# Patient Record
(demographics unavailable — no encounter records)

---

## 2024-11-12 NOTE — HISTORY OF PRESENT ILLNESS
[de-identified] : Mr. VIKKI CUETO is a 82 year old gentleman presenting for follow up evaluation with his daughter of a left greater tuberosity fracture sustained on 7/1/24, being treated nonoperatively. Since his last visit he states he is feeling

## 2024-11-12 NOTE — DISCUSSION/SUMMARY
[de-identified] : 82-year-old gentleman with a left greater tuberosity fracture, approximately 4 months out, treated nonoperatively.  -X-ray and physical exam findings were discussed with the patient -WBAT left UE -Physical Therapy: work on ROM of the left shoulder  -Tylenol for pain -Follow-up in 4 months with x-rays of the left shoulder at that time.  -All of the patient's questions and concerns were addressed.

## 2024-11-12 NOTE — PHYSICAL EXAM
[de-identified] : Mr. VIKKI CUETO is sitting comfortably in the exam room  LEFT shoulder -Skin is intact, no swelling, no ecchymosis -Passive FE: 90 , ER: 5 , IR: Hip , Passive ABD: 45 -Able to make a fist -Sensation is intact median, radial, ulnar, axillary nerves -Motor is intact median, radial, ulnar, axillary nerves -Hand is warm and well-perfused, Palpable radial and ulnar pulses   The patient is sitting comfortably in the exam room.  BILATERAL legs -Skin is intact, no swelling, no ecchymosis -Range of motion  -Negative Lachman, negative anterior drawer, negative posterior drawer  -Negative Yasmeen  -Sensation is intact L1-S1  -5/5 EHL, FHL, TA, GS, quadriceps, hamstrings  -Foot is warm and well-perfused, palpable dorsalis pedis pulse  [de-identified] :  X-rays of the left shoulder were taken in the office today, 11/13/24. AP, Scap Y, Axillary.  X-rays show good overall alignment of the shoulder and proximal humerus.  The glenohumeral joint is well reduced.  There is interval healing and remodeling of the greater tuberosity fracture

## 2025-01-15 NOTE — HISTORY OF PRESENT ILLNESS
[de-identified] : Mr. VIKKI CUETO is a 82 year old gentleman presenting for follow up evaluation with his daughter of a left greater tuberosity fracture sustained on 7/1/24, being treated nonoperatively. Since his last visit he states he is feeling better. He has been ambulating with a cane due to weakness of the lower extremities.

## 2025-01-15 NOTE — PHYSICAL EXAM
[de-identified] : Mr. VIKKI CUETO is sitting comfortably in the exam room  LEFT shoulder -Skin is intact, no swelling, no ecchymosis -FE: 120 , ER: 15 , IR: L1, ABD: 90 -Able to make a fist -Sensation is intact median, radial, ulnar, axillary nerves -Motor is intact median, radial, ulnar, axillary nerves -Hand is warm and well-perfused, Palpable radial and ulnar pulses   The patient is sitting comfortably in the exam room.  BILATERAL legs -Skin is intact, no swelling, no ecchymosis -Range of motion 0-90 -Negative Lachman, negative anterior drawer, negative posterior drawer  -Negative Yasmeen  -Sensation is intact L1-S1  -5/5 EHL, FHL, TA, GS, quadriceps, hamstrings  -Foot is warm and well-perfused, palpable dorsalis pedis pulse  [de-identified] :  X-rays of the left shoulder were taken in the office today, 1/8/25. AP, Scap Y, Axillary.  X-rays show good overall alignment of the shoulder and proximal humerus.  The glenohumeral joint is well reduced.  There is interval healing and remodeling of the greater tuberosity fracture

## 2025-01-15 NOTE — PHYSICAL EXAM
[de-identified] : Mr. VIKKI CUETO is sitting comfortably in the exam room  LEFT shoulder -Skin is intact, no swelling, no ecchymosis -FE: 120 , ER: 15 , IR: L1, ABD: 90 -Able to make a fist -Sensation is intact median, radial, ulnar, axillary nerves -Motor is intact median, radial, ulnar, axillary nerves -Hand is warm and well-perfused, Palpable radial and ulnar pulses   The patient is sitting comfortably in the exam room.  BILATERAL legs -Skin is intact, no swelling, no ecchymosis -Range of motion 0-90 -Negative Lachman, negative anterior drawer, negative posterior drawer  -Negative Yasmeen  -Sensation is intact L1-S1  -5/5 EHL, FHL, TA, GS, quadriceps, hamstrings  -Foot is warm and well-perfused, palpable dorsalis pedis pulse  [de-identified] :  X-rays of the left shoulder were taken in the office today, 1/8/25. AP, Scap Y, Axillary.  X-rays show good overall alignment of the shoulder and proximal humerus.  The glenohumeral joint is well reduced.  There is interval healing and remodeling of the greater tuberosity fracture

## 2025-01-15 NOTE — DISCUSSION/SUMMARY
[de-identified] : 82-year-old gentleman with a left greater tuberosity fracture, approximately 6 months out, treated nonoperatively.  -X-ray and physical exam findings were discussed with the patient -WBAT left UE -Physical Therapy: work on ROM of the left shoulder and strengthening of bilateral knees.  -Tylenol for pain -Follow-up as needed with x-rays of the left shoulder at that time.  -All of the patient's questions and concerns were addressed.

## 2025-01-15 NOTE — PHYSICAL EXAM
[de-identified] : Mr. VIKKI CUETO is sitting comfortably in the exam room  LEFT shoulder -Skin is intact, no swelling, no ecchymosis -FE: 120 , ER: 15 , IR: L1, ABD: 90 -Able to make a fist -Sensation is intact median, radial, ulnar, axillary nerves -Motor is intact median, radial, ulnar, axillary nerves -Hand is warm and well-perfused, Palpable radial and ulnar pulses   The patient is sitting comfortably in the exam room.  BILATERAL legs -Skin is intact, no swelling, no ecchymosis -Range of motion 0-90 -Negative Lachman, negative anterior drawer, negative posterior drawer  -Negative Yasmeen  -Sensation is intact L1-S1  -5/5 EHL, FHL, TA, GS, quadriceps, hamstrings  -Foot is warm and well-perfused, palpable dorsalis pedis pulse  [de-identified] :  X-rays of the left shoulder were taken in the office today, 1/8/25. AP, Scap Y, Axillary.  X-rays show good overall alignment of the shoulder and proximal humerus.  The glenohumeral joint is well reduced.  There is interval healing and remodeling of the greater tuberosity fracture

## 2025-01-15 NOTE — DISCUSSION/SUMMARY
[de-identified] : 82-year-old gentleman with a left greater tuberosity fracture, approximately 6 months out, treated nonoperatively.  -X-ray and physical exam findings were discussed with the patient -WBAT left UE -Physical Therapy: work on ROM of the left shoulder and strengthening of bilateral knees.  -Tylenol for pain -Follow-up as needed with x-rays of the left shoulder at that time.  -All of the patient's questions and concerns were addressed.

## 2025-01-15 NOTE — DISCUSSION/SUMMARY
[de-identified] : 82-year-old gentleman with a left greater tuberosity fracture, approximately 6 months out, treated nonoperatively.  -X-ray and physical exam findings were discussed with the patient -WBAT left UE -Physical Therapy: work on ROM of the left shoulder and strengthening of bilateral knees.  -Tylenol for pain -Follow-up as needed with x-rays of the left shoulder at that time.  -All of the patient's questions and concerns were addressed.

## 2025-01-15 NOTE — HISTORY OF PRESENT ILLNESS
[de-identified] : Mr. VIKKI CUETO is a 82 year old gentleman presenting for follow up evaluation with his daughter of a left greater tuberosity fracture sustained on 7/1/24, being treated nonoperatively. Since his last visit he states he is feeling better. He has been ambulating with a cane due to weakness of the lower extremities.

## 2025-01-15 NOTE — HISTORY OF PRESENT ILLNESS
[de-identified] : Mr. VIKKI CUETO is a 82 year old gentleman presenting for follow up evaluation with his daughter of a left greater tuberosity fracture sustained on 7/1/24, being treated nonoperatively. Since his last visit he states he is feeling better. He has been ambulating with a cane due to weakness of the lower extremities.